# Patient Record
Sex: FEMALE | Race: OTHER | NOT HISPANIC OR LATINO | URBAN - METROPOLITAN AREA
[De-identification: names, ages, dates, MRNs, and addresses within clinical notes are randomized per-mention and may not be internally consistent; named-entity substitution may affect disease eponyms.]

---

## 2023-12-22 ENCOUNTER — HOSPITAL ENCOUNTER (EMERGENCY)
Facility: HOSPITAL | Age: 33
Discharge: HOME | End: 2023-12-22
Attending: STUDENT IN AN ORGANIZED HEALTH CARE EDUCATION/TRAINING PROGRAM

## 2023-12-22 VITALS
OXYGEN SATURATION: 99 % | DIASTOLIC BLOOD PRESSURE: 88 MMHG | RESPIRATION RATE: 18 BRPM | TEMPERATURE: 98 F | SYSTOLIC BLOOD PRESSURE: 121 MMHG | WEIGHT: 138 LBS | HEART RATE: 91 BPM

## 2023-12-22 DIAGNOSIS — K08.89 TOOTHACHE: Primary | ICD-10-CM

## 2023-12-22 PROCEDURE — 99284 EMERGENCY DEPT VISIT MOD MDM: CPT

## 2023-12-22 RX ORDER — AMOXICILLIN AND CLAVULANATE POTASSIUM 875; 125 MG/1; MG/1
1 TABLET, FILM COATED ORAL 2 TIMES DAILY
Qty: 14 TABLET | Refills: 0 | Status: SHIPPED | OUTPATIENT
Start: 2023-12-22

## 2023-12-22 RX ORDER — IBUPROFEN 800 MG/1
800 TABLET ORAL 3 TIMES DAILY PRN
Qty: 20 TABLET | Refills: 0 | Status: SHIPPED | OUTPATIENT
Start: 2023-12-22 | End: 2024-01-01

## 2023-12-22 RX ORDER — TRAMADOL HYDROCHLORIDE 50 MG/1
50 TABLET ORAL EVERY 8 HOURS PRN
Qty: 12 TABLET | Refills: 0 | Status: SHIPPED | OUTPATIENT
Start: 2023-12-22 | End: 2023-12-26

## 2023-12-22 NOTE — ED PROVIDER NOTES
"Encounter Date: 12/22/2023       History     Chief Complaint   Patient presents with    Dental Pain     Pt reports left upper dental abscess for the past 2 months, waiting on [facility] to call for appointment. Denies fever. Reports today had "shocking" pain that shot up her left face.     See MDM    The history is provided by the patient. No  was used.     Review of patient's allergies indicates:  No Known Allergies  No past medical history on file.  No past surgical history on file.  No family history on file.     Review of Systems   HENT:  Positive for dental problem.    All other systems reviewed and are negative.      Physical Exam     Initial Vitals [12/22/23 1116]   BP Pulse Resp Temp SpO2   121/88 91 18 97.9 °F (36.6 °C) 99 %      MAP       --         Physical Exam    Nursing note and vitals reviewed.  Constitutional: She appears well-developed and well-nourished.   HENT:   Mouth/Throat: No trismus in the jaw. Normal dentition. No dental abscesses or dental caries.       Cardiovascular:  Normal rate.           Pulmonary/Chest: No respiratory distress.     Neurological: She is alert and oriented to person, place, and time.   Skin: Skin is warm and dry.   Psychiatric: She has a normal mood and affect.         ED Course   Procedures  Labs Reviewed   POCT URINE PREGNANCY          Imaging Results    None          Medications - No data to display  Medical Decision Making  32 y/o female presents with having dental abscess about 2 months ago and she is waiting to get in with dentist. She did take antibiotics then. States today while driving she felt sudden sharp pain in the same tooth and it shot up her face. No fever. No trauma. She states the pain is still there, not as intense currently.     No obvious dental abscess. I do not see a fracture either. Will treat with antibiotics and pain medicine. Certainly needs to see dentist. Upt neg.     Amount and/or Complexity of Data Reviewed  Labs: ordered. " Decision-making details documented in ED Course.      Additional MDM:   Differential Diagnosis:   Other: The following diagnoses were also considered and will be evaluated: dental abscess, trismus and dental root infection.                                   Clinical Impression:  Final diagnoses:  [K08.89] Toothache (Primary)          ED Disposition Condition    Discharge Stable          ED Prescriptions       Medication Sig Dispense Start Date End Date Auth. Provider    amoxicillin-clavulanate 875-125mg (AUGMENTIN) 875-125 mg per tablet Take 1 tablet by mouth 2 (two) times daily. 14 tablet 12/22/2023 -- Thelma Garzon FNP    ibuprofen (ADVIL,MOTRIN) 800 MG tablet Take 1 tablet (800 mg total) by mouth 3 (three) times daily as needed for Pain. 20 tablet 12/22/2023 1/1/2024 Thelma Garzon FNP    traMADoL (ULTRAM) 50 mg tablet Take 1 tablet (50 mg total) by mouth every 8 (eight) hours as needed for Pain. 12 tablet 12/22/2023 12/26/2023 Thelma Garzon FNP          Follow-up Information    None          Thelma Garzon FNP  12/22/23 [id number]

## 2023-12-22 NOTE — Clinical Note
"Marianela"Ethel Casper was seen and treated in our emergency department on 12/22/2023.  She may return to work on 12/26/2023.       If you have any questions or concerns, please don't hesitate to call.      GLENN Mcnamara RN    "

## 2023-12-22 NOTE — DISCHARGE INSTRUCTIONS
Warm salt water swish and spit. Augmentin for 7 days for infection. Ibuprofen for pain/inflammation, take with food. Tramadol for more severe pain, do not take and drive. Follow up with dentist.